# Patient Record
Sex: FEMALE | Race: WHITE
[De-identification: names, ages, dates, MRNs, and addresses within clinical notes are randomized per-mention and may not be internally consistent; named-entity substitution may affect disease eponyms.]

---

## 2019-10-20 ENCOUNTER — HOSPITAL ENCOUNTER (INPATIENT)
Dept: HOSPITAL 97 - 2ND-WC | Age: 24
LOS: 4 days | Discharge: HOME | End: 2019-10-24
Attending: SPECIALIST | Admitting: SPECIALIST
Payer: COMMERCIAL

## 2019-10-20 VITALS — BODY MASS INDEX: 34.2 KG/M2

## 2019-10-20 DIAGNOSIS — Z3A.40: ICD-10-CM

## 2019-10-20 PROCEDURE — 85014 HEMATOCRIT: CPT

## 2019-10-20 PROCEDURE — 36415 COLL VENOUS BLD VENIPUNCTURE: CPT

## 2019-10-20 PROCEDURE — 81015 MICROSCOPIC EXAM OF URINE: CPT

## 2019-10-20 PROCEDURE — 87086 URINE CULTURE/COLONY COUNT: CPT

## 2019-10-20 PROCEDURE — 86901 BLOOD TYPING SEROLOGIC RH(D): CPT

## 2019-10-20 PROCEDURE — 90471 IMMUNIZATION ADMIN: CPT

## 2019-10-20 PROCEDURE — 86592 SYPHILIS TEST NON-TREP QUAL: CPT

## 2019-10-20 PROCEDURE — 81003 URINALYSIS AUTO W/O SCOPE: CPT

## 2019-10-20 PROCEDURE — 85025 COMPLETE CBC W/AUTO DIFF WBC: CPT

## 2019-10-20 PROCEDURE — 87340 HEPATITIS B SURFACE AG IA: CPT

## 2019-10-20 PROCEDURE — 87088 URINE BACTERIA CULTURE: CPT

## 2019-10-22 NOTE — PREOPHP
Date of Admission:  10/20/2019



History Of Present Illness:  Ms. Gonzalez is a 24-year-old  female,  2, para 1-0-0-1, 
at 40+ weeks gestation.  She is admitted for elective induction of labor.



Past Medical History:  Please see prenatal record.



Family History:  Please see prenatal record.



Review of Systems:

She reports no recent cough, cold, fever, or chills.  No recent nausea or vomiting.  She denies any b
reast lumps or knots.  She denies any bowel or bladder issues.  Infant has been active.  She denies a
ny vaginal bleeding or spotting.



Physical Examination:

General:  Reveals a pleasant  female, in no apparent distress. 

Neck:  Supple without adenopathy or thyromegaly. 

Lungs:  Clear. 

Cardiac:  Regular rate and rhythm without murmurs. 

Breasts:  Not examined. 

Abdomen:  Estimated fetal weight of 7+ to 8+ pounds. 

Pelvic:  Cervix noted to be 2 cm dilated, vertex at -1 to -2 station with 30% effaced cervix. 

Extremities:  No cyanosis, clubbing, or edema.



Impression:  Term pregnancy.



Plan:  Patient will be admitted for induction of labor tomorrow.  She has signed a permit in my prese
nce.





NACHO/MODL

DD:  10/22/2019 12:40:51Voice ID:  [id number]

## 2019-10-23 LAB
HCT VFR BLD CALC: 29.8 % (ref 36–45)
LYMPHOCYTES # SPEC AUTO: 1.7 K/UL (ref 0.7–4.9)
PMV BLD: 7.8 FL (ref 7.6–11.3)
RBC # BLD: 3.9 M/UL (ref 3.86–4.86)
RPR SER QL: (no result)
UA COMPLETE W REFLEX CULTURE PNL UR: (no result)
UA DIPSTICK W REFLEX MICRO PNL UR: (no result)

## 2019-10-23 PROCEDURE — 10907ZC DRAINAGE OF AMNIOTIC FLUID, THERAPEUTIC FROM PRODUCTS OF CONCEPTION, VIA NATURAL OR ARTIFICIAL OPENING: ICD-10-PCS

## 2019-10-23 PROCEDURE — 0KQM0ZZ REPAIR PERINEUM MUSCLE, OPEN APPROACH: ICD-10-PCS

## 2019-10-23 NOTE — P.BOP
Preoperative diagnosis: 40+wk pregnancy


Postoperative diagnosis: same, viable female infant


Primary procedure: SCVD


Secondary procedure: repair 2 degree midline laceration


Estimated blood loss: 300


Anesthesia: Local


Complications: None


Transferred to: Other (271)


Condition: Good

## 2019-10-24 VITALS — DIASTOLIC BLOOD PRESSURE: 69 MMHG | SYSTOLIC BLOOD PRESSURE: 121 MMHG | TEMPERATURE: 97 F
